# Patient Record
Sex: MALE | Race: WHITE | URBAN - METROPOLITAN AREA
[De-identification: names, ages, dates, MRNs, and addresses within clinical notes are randomized per-mention and may not be internally consistent; named-entity substitution may affect disease eponyms.]

---

## 2018-02-06 ENCOUNTER — HOSPITAL ENCOUNTER (EMERGENCY)
Facility: CLINIC | Age: 28
Discharge: HOME | End: 2018-02-06
Attending: EMERGENCY MEDICINE | Admitting: EMERGENCY MEDICINE

## 2018-02-06 VITALS
HEART RATE: 81 BPM | WEIGHT: 205 LBS | HEIGHT: 72 IN | BODY MASS INDEX: 27.77 KG/M2 | RESPIRATION RATE: 16 BRPM | TEMPERATURE: 98.2 F | DIASTOLIC BLOOD PRESSURE: 70 MMHG | SYSTOLIC BLOOD PRESSURE: 126 MMHG | OXYGEN SATURATION: 100 %

## 2018-02-06 DIAGNOSIS — R22.1 MASS OF NECK: ICD-10-CM

## 2018-02-06 DIAGNOSIS — L04.9 LYMPHADENITIS, ACUTE: ICD-10-CM

## 2018-02-06 PROCEDURE — 76536 US EXAM OF HEAD AND NECK: CPT

## 2018-02-06 PROCEDURE — 99284 EMERGENCY DEPT VISIT MOD MDM: CPT | Mod: 25

## 2018-02-06 ASSESSMENT — ENCOUNTER SYMPTOMS
FEVER: 0
SORE THROAT: 1

## 2018-02-06 NOTE — ED NOTES
Patient presents with lump throat/neck that started last night and has had a sore throat for 1 week. ABC's intact.

## 2018-02-06 NOTE — ED AVS SNAPSHOT
[facility] Emergency Department    [address]    [facility] [address]    Phone:  [phone]    Fax:  [phone]                                       Theron Berman   MRN: [id number]    Department:  [facility] Emergency Department   Date of Visit:  2/6/2018           After Visit Summary Signature Page     I have received my discharge instructions, and my questions have been answered. I have discussed any challenges I see with this plan with the nurse or doctor.    ..........................................................................................................................................  Patient/Patient Representative Signature      ..........................................................................................................................................  Patient Representative Print Name and Relationship to Patient    ..................................................               ................................................  Date                                            Time    ..........................................................................................................................................  Reviewed by Signature/Title    ...................................................              ..............................................  Date                                                            Time

## 2018-02-06 NOTE — ED AVS SNAPSHOT
[facility]    [address] [facility] [address]    Phone:  [phone]    Fax:  [phone]                                       Theron Berman   MRN: [id number]    Department:  [facility] Emergency Department   Date of Visit:  2/6/2018           Patient Information     Date Of Birth          1990        Your diagnoses for this visit were:     Mass of neck     Lymphadenitis, acute        You were seen by Bryan Reyes MD.      Follow-up Information     Follow up with Clinic, [facility]. Schedule an appointment as soon as possible for a visit in 1 day.    Specialty:  Internal Medicine    Why:  For re-check of right neck    Contact information:    [address] [place] [address]  [phone]          Follow up with [facility] Emergency Department.    Specialty:  EMERGENCY MEDICINE    Why:  If symptoms worsen    Contact information:    [address] [place] [address] [phone]        Discharge Instructions               Discharge References/Attachments     LYMPHADENOPATHY (ENGLISH)      24 Hour Appointment Hotline       To make an appointment at any [facility], call [phone] ([phone]). If you don't have a family doctor or clinic, we will help you find one. [facility] are conveniently located to serve the needs of you and your family.             Review of your medicines      Notice     You have not been prescribed any medications.            Procedures and tests performed during your visit     POC US SOFT TISSUE      Orders Needing Specimen Collection     None      Pending Results     No orders found from 2/4/2018 to 2/7/2018.            Pending Culture Results     No orders found from 2/4/2018 to 2/7/2018.            Pending Results Instructions     If you had any lab results that were not finalized at the time of your Discharge, you can call the ED Lab  Result RN at [phone]. You will be contacted by this team for any positive Lab results or changes in treatment. The nurses are available 7 days a week from 10A to 6:30P.  You can leave a message 24 hours per day and they will return your call.        Test Results From Your Hospital Stay        2/6/2018  6:59 PM      Narrative     PROCEDURE: Limited Bedside Point of Care Ultrasound: Soft Tissue  PERFORMED BY: Dr. Bryan Reyes  INDICATIONS/SYMPTOM:  Evaluate for abscess vs lymph node  PROBE: High frequency linear probe  BODY LOCATION: Soft tissue located on:  Right anterior neck   FINDINGS:    Other: Complex appearing lymph node tissue mass, no color flow in real time imaging.  INTERPRETATION:  The soft tissues were evaluated.    1.  Lymphadenitis, no evidence of cellulitis or abscess.  No color flow over mass.     DOCUMENTATION: Images were archived to the [employer] hard drive and archived to PACS.  Real time color flow images were not saved, but viewed in real time.                  Clinical Quality Measure: Blood Pressure Screening     Your blood pressure was checked while you were in the emergency department today. The last reading we obtained was  BP: 126/70 . Please read the guidelines below about what these numbers mean and what you should do about them.  If your systolic blood pressure (the top number) is less than 120 and your diastolic blood pressure (the bottom number) is less than 80, then your blood pressure is normal. There is nothing more that you need to do about it.  If your systolic blood pressure (the top number) is 120-139 or your diastolic blood pressure (the bottom number) is 80-89, your blood pressure may be higher than it should be. You should have your blood pressure rechecked within a year by a primary care provider.  If your systolic blood pressure (the top number) is 140 or greater or your diastolic blood pressure (the bottom number) is 90 or greater, you may have high blood pressure. High  "blood pressure is treatable, but if left untreated over time it can put you at risk for heart attack, stroke, or kidney failure. You should have your blood pressure rechecked by a primary care provider within the next 4 weeks.  If your provider in the emergency department today gave you specific instructions to follow-up with your doctor or provider even sooner than that, you should follow that instruction and not wait for up to 4 weeks for your follow-up visit.        Thank you for choosing [facility]       Thank you for choosing [facility] for your care. Our goal is always to provide you with excellent care. Hearing back from our patients is one way we can continue to improve our services. Please take a few minutes to complete the written survey that you may receive in the mail after you visit with us. Thank you!        [employer] Information     [employer] lets you send messages to your doctor, view your test results, renew your prescriptions, schedule appointments and more. To sign up, go to [email or web address] . Click on \"Log in\" on the left side of the screen, which will take you to the Welcome page. Then click on \"Sign up Now\" on the right side of the page.     You will be asked to enter the access code listed below, as well as some personal information. Please follow the directions to create your username and password.     Your access code is: [email or web address]  Expires: 2018  7:47 PM     Your access code will  in 90 days. If you need help or a new code, please call your [facility] clinic or [phone].        Care EveryWhere ID     This is your Care EveryWhere ID. This could be used by other organizations to access your [facility] medical records  [id number]        Equal Access to Services     BRITTNEY KISER : Hadzachery Lechuga, alfa booker, thaddeus branch. So [facility] [phone].    ATENCIÓN: Si habla español, tiene a mccallum disposición servicios " ashvin de asistencia lingüística. Shane celaya [phone].    We comply with applicable federal civil rights laws and [place] laws. We do not discriminate on the basis of race, color, national origin, age, disability, sex, sexual orientation, or gender identity.            After Visit Summary       This is your record. Keep this with you and show to your community pharmacist(s) and doctor(s) at your next visit.

## 2018-02-07 NOTE — ED NOTES
When nurse entered room to discharge patient, patient was gone, left without discharge instructions

## 2018-02-07 NOTE — ED NOTES
Pt resting Comfortably on bed.  Alert & Oriented  Pt C/O lump/swelling in his neck.  No other complaints.  He states is came on last night at ~2100rs.  No hx of trauma.  See follow assessment.

## 2018-02-07 NOTE — ED PROVIDER NOTES
History     Chief Complaint:  Oral Swelling    HPI   Theron Berman is a 27 year old male who presents with oral swelling. The patient states that for the last week he has been experiencing a sore throat which last night turned into a lump on the right side of his throat. He denies any pain swallowing, though notes his throat feels dry. He states that he is currently sexually active with one woman who he does not use protection with. He has not been tested for STD's in the last couple of months. He denies penile discharge or swelling/pain in his groin. Patient denies fevers and all other complaints.     Allergies:  No known drug allergies      Medications:    The patient is not currently taking any prescribed medications.     Past Medical History:    The patient does not have any past pertinent medical history.     Past Surgical History:    History reviewed. No pertinent surgical history.     Family History:    History reviewed. No pertinent family history.      Social History:  Presents with brother   Tobacco use: Never  Alcohol use: Unknown  PCP: Physician No Ref-Primary    Marital Status:       Review of Systems   Constitutional: Negative for fever.   HENT: Positive for sore throat.         Oral swelling   Genitourinary: Negative for discharge, penile pain, penile swelling, scrotal swelling and testicular pain.   All other systems reviewed and are negative.    Physical Exam     Patient Vitals for the past 24 hrs:   BP Temp Temp src Pulse Resp SpO2 Height Weight   02/06/18 1756 126/70 98.2  F (36.8  C) Oral 81 16 100 % 1.829 m (6') 93 kg (205 lb)     Physical Exam  General: Alert, appears well-developed and well-nourished. Cooperative.     In no acute distress  HEENT:  Head:  Atraumatic  Ears:  External ears are normal. Bilateral TM's clear and intact.  Mouth/Throat:  Oropharynx is without erythema or exudate and mucous membranes are moist. No intraoral abscesses or masses detect. Normal dentition.    Eyes:   Conjunctivae normal and EOM are normal. No scleral icterus.    Pupils are equal, round, and reactive to light.   Neck:   Normal range of motion. Neck supple. Swollen right anterior cervical lymph node. Mild discomfort to palpation.   CV:  Normal rate, regular rhythm, normal heart sounds and radial pulses are 2+ and symmetric.  No murmur.  Resp:  Breath sounds are clear bilaterally    Non-labored, no retractions or accessory muscle use  GI:  Abdomen is soft, no distension, no tenderness. No rebound or guarding.    MS:  Normal range of motion. No edema.    Normal strength in all 4 extremities.     Back atraumatic.  Skin:  Warm and dry.  No rash or lesions noted.  Neuro:   Alert. Normal strength.  GCS: 15  Psych: Normal mood and affect.  Lymph: Unilateral right anterior cervical lymphadenopathy noted. No posterior cervical lymphadenopathy.     Emergency Department Course   Imaging:  Radiographic findings were communicated with the patient and family who voiced understanding of the findings.    POC US Soft Tissue:  PROCEDURE: Limited Bedside Point of Care Ultrasound: Soft Tissue  PERFORMED BY: Dr. Bryan Reyes  INDICATIONS/SYMPTOM:  Evaluate for abscess vs lymph node  PROBE: High frequency linear probe  BODY LOCATION: Soft tissue located on:  Right anterior neck   FINDINGS:   Other: Complex appearing lymph node tissue mass, no color flow in real time imaging.  INTERPRETATION:  The soft tissues were evaluated.    1.  Lymphadenitis, no evidence of cellulitis or abscess.  No color flow over mass.     Emergency Department Course:  Past medical records, nursing notes, and vitals reviewed.  1815: I performed an exam of the patient as documented above. Clinical findings and plan explained to the Patient.   POC US was obtained.  1900: Patient discharged home with instructions regarding supportive care, medications, and reasons to return as well as the importance of close follow-up were reviewed.      Impression & Plan     Medical Decision Making:  Theron Berman is a 27 year old male who presents with unilateral cervical lymphadenitis. Patient has no intraoral swelling or abscesses detected. Patient has normal dentition. Low suspicion for salivary adenitis. Patient has very minimal tenderness to the area. No overlying cellulitis. Very low concern abscess. Patient has no exposure to cats, low suspicion for cat scratch disease. I did ultrasound the area which in some aspects looks like a complex mass, although on still frames the tissue does look very much an enlarged lymph node gland. There is no color flow over the area on real time, unlikely vascular etiology of mass. Patient has no fever and minimal tenderness so very low concern for abscess. He has a normal neurologic exam here. He has no headache or vision changes. I do suspect that this is most likely a swollen lymph node on the right side of his anterior cervical lymph chain. Patient elected to manage conservatively, which I feel comfortable with. Patient has no evidence of airway obstruction. He is breathing, eating and drinking normally. He has no wheezing and is not having any evidence of drooling and is handling secretions well. Patient did have a sore throat about one week ago and this may represent mononucleosis, although the patient is not having any other symptoms consistent with this such as fatigue or recurrent fevers. Patient has no sore throat now, tonsils are not swollen. No evidence of peritonsillar abscess or retropharyngeal abscess. Full range of motion of neck, no meningismus. He will manage with Tylenol, Ibuprofen, and conservative treatments such as warm compress. patient will follow closely with primary care or return to the ED in 24-48 hours for recheck. He understands close return precautions and that if he were to develop significant pain or worsening symptoms with shortness of breath or difficulty swallowing. All questions answered prior to  discharge. Close return precautions understood. Discharged to home.     Diagnosis:    ICD-10-CM    1. Mass of neck R22.1    2. Lymphadenitis, acute L04.9      Disposition:  Discharged to home with plan as outlined.       2/6/2018   [facility] EMERGENCY DEPARTMENT  Kelly BANKS, hugo serving as a scribe at 6:15 PM on 2/6/2018 to document services personally performed by Bryan Reyes MD based on my observations and the provider's statements to me.       Bryan Reyes MD  02/06/18 9853